# Patient Record
Sex: MALE | Race: WHITE | NOT HISPANIC OR LATINO | URBAN - METROPOLITAN AREA
[De-identification: names, ages, dates, MRNs, and addresses within clinical notes are randomized per-mention and may not be internally consistent; named-entity substitution may affect disease eponyms.]

---

## 2017-01-25 ENCOUNTER — OFFICE VISIT - RIVER FALLS (OUTPATIENT)
Dept: FAMILY MEDICINE | Facility: CLINIC | Age: 42
End: 2017-01-25

## 2017-01-25 ASSESSMENT — MIFFLIN-ST. JEOR: SCORE: 1756.93

## 2017-10-11 ENCOUNTER — OFFICE VISIT - RIVER FALLS (OUTPATIENT)
Dept: FAMILY MEDICINE | Facility: CLINIC | Age: 42
End: 2017-10-11

## 2017-10-11 ASSESSMENT — MIFFLIN-ST. JEOR: SCORE: 1750.46

## 2019-10-01 ENCOUNTER — OFFICE VISIT - RIVER FALLS (OUTPATIENT)
Dept: FAMILY MEDICINE | Facility: CLINIC | Age: 44
End: 2019-10-01

## 2019-10-01 LAB
ALBUMIN UR-MCNC: NEGATIVE G/DL
APPEARANCE UR: CLEAR
BILIRUB UR QL STRIP: NEGATIVE
COLOR UR AUTO: YELLOW
GLUCOSE UR STRIP-MCNC: NEGATIVE MG/DL
HGB UR QL STRIP: NEGATIVE
KETONES UR STRIP-MCNC: NEGATIVE MG/DL
LEUKOCYTE ESTERASE UR QL STRIP: NEGATIVE
NITRATE UR QL: NEGATIVE
PH UR STRIP: 6 [PH]
SP GR UR STRIP: 1.01
UROBILINOGEN UR STRIP-MCNC: NORMAL MG/DL

## 2019-10-01 ASSESSMENT — MIFFLIN-ST. JEOR: SCORE: 1761.24

## 2020-01-07 ENCOUNTER — OFFICE VISIT - RIVER FALLS (OUTPATIENT)
Dept: FAMILY MEDICINE | Facility: CLINIC | Age: 45
End: 2020-01-07

## 2020-01-07 ASSESSMENT — MIFFLIN-ST. JEOR: SCORE: 1749.45

## 2022-02-12 VITALS
HEIGHT: 71 IN | HEART RATE: 78 BPM | BODY MASS INDEX: 26.63 KG/M2 | SYSTOLIC BLOOD PRESSURE: 98 MMHG | WEIGHT: 190.2 LBS | OXYGEN SATURATION: 98 % | DIASTOLIC BLOOD PRESSURE: 66 MMHG | TEMPERATURE: 98.2 F

## 2022-02-12 VITALS
DIASTOLIC BLOOD PRESSURE: 60 MMHG | BODY MASS INDEX: 24.98 KG/M2 | HEIGHT: 72 IN | HEART RATE: 56 BPM | TEMPERATURE: 96.4 F | SYSTOLIC BLOOD PRESSURE: 96 MMHG | WEIGHT: 184.4 LBS

## 2022-02-12 VITALS
HEIGHT: 72 IN | TEMPERATURE: 97.5 F | SYSTOLIC BLOOD PRESSURE: 110 MMHG | BODY MASS INDEX: 24.92 KG/M2 | OXYGEN SATURATION: 97 % | DIASTOLIC BLOOD PRESSURE: 60 MMHG | WEIGHT: 184 LBS | HEART RATE: 67 BPM

## 2022-02-12 VITALS
HEART RATE: 49 BPM | HEIGHT: 72 IN | BODY MASS INDEX: 25.27 KG/M2 | OXYGEN SATURATION: 96 % | DIASTOLIC BLOOD PRESSURE: 60 MMHG | WEIGHT: 186.6 LBS | SYSTOLIC BLOOD PRESSURE: 102 MMHG

## 2022-02-15 NOTE — PROGRESS NOTES
Patient:   QUYNH LOZADA            MRN: [id number]            FIN: [id number]               Age:   44 years     Sex:  Male     :  1975   Associated Diagnoses:   's permit physical examination   Author:   Zachariah Segovia PA-C      Visit Information      Date of Service: 10/01/2019 10:00 am  Performing Location: [facility]  Encounter#: [id number]      Primary Care Provider (PCP):  Zachariah Segovia PA-C    NPI# [id number]      Referring Provider:  Zachariah Segovia PA-C    NPI# [id number]   Visit type:  DOT exam.    Accompanied by:  No one.    Source of history:  Self.    Referral source:  Self.       Chief Complaint   10/1/2019 10:04 AM CDT   DOT PX and UA     Here for DOT exam.      Well Adult History   Well Adult History   See scanned DOT form for history..     Remote amputation tip right thumb. No limitations      Review of Systems   Constitutional:  Negative.    Eye:  Negative.    Ear/Nose/Mouth/Throat:  Negative.    Respiratory:  Negative.    Cardiovascular:  Negative.    Gastrointestinal:  Negative.    Genitourinary:  Negative.    Hematology/Lymphatics:  Negative.    Endocrine:  Negative.    Immunologic:  Negative.    Musculoskeletal:  Negative.    Integumentary:  Negative.    Neurologic:  Negative.    Psychiatric:  Negative.    All other systems reviewed and negative      Health Status   Allergies:    Allergic Reactions (All)  No Known Medication Allergies   Medications:  (Selected)      Problem list:    No problem items selected or recorded.      Histories   Past Medical History:    No active or resolved past medical history items have been selected or recorded.   Family History:    No family history items have been selected or recorded.   Procedure history:    No active procedure history items have been selected or recorded.   Social History:             No active social history items have been recorded.      Physical Examination   Vital Signs   10/1/2019 10:04 AM CDT Peripheral Pulse  Rate 49 bpm  LOW    HR Method Electronic    Systolic Blood Pressure 102 mmHg    Diastolic Blood Pressure 60 mmHg    Mean Arterial Pressure 74 mmHg    BP Site Right arm    BP Method Manual    Oxygen Saturation 96 %      Measurements from flowsheet : Measurements   10/1/2019 10:04 AM CDT Height Measured - Standard 71.8 in    Weight Measured - Standard 186.6 lb    BSA 2.07 m2    Body Mass Index 25.45 kg/m2  HI      General:  Alert and oriented, No acute distress.    Eye:  Pupils are equal, round and reactive to light, Extraocular movements are intact, Normal conjunctiva, Vision unchanged.    HENT:  Normocephalic, Tympanic membranes are clear, Normal hearing, Oral mucosa is moist, No pharyngeal erythema.    Neck:  Supple, Non-tender, No lymphadenopathy.    Respiratory:  Lungs are clear to auscultation, Respirations are non-labored, Breath sounds are equal.    Cardiovascular:  Normal rate, Regular rhythm, No murmur.    Gastrointestinal:  Soft, Non-tender, Non-distended, Normal bowel sounds, No organomegaly.    Genitourinary:  No costovertebral angle tenderness.    Musculoskeletal:  Normal range of motion, Normal strength, No tenderness, Normal gait.    Integumentary:  Warm, Moist, No rash.    Neurologic:  Alert, Oriented, No focal deficits, Normal deep tendon reflexes.    Psychiatric:  Cooperative, Appropriate mood & affect.       Health Maintenance      Recommendations     Pending (in the next year)        OverDue           HIV Screen (if sexually active) (Male) due  10/03/12  and every 1  year(s)           Alcohol Misuse Screen (Male) due  01/25/18  and every 1  year(s)           Depression Screen (Male) due  01/25/18  and every 1  year(s)        Due            Influenza Vaccine due  09/01/19  and every 1  year(s)           Lipid Disorders Screen (Male) due  10/01/19  and every 1  year(s)           STD Counseling (if sexually active) (Male) due  10/01/19  and every 1  year(s)           Syphilis Screen (if sexually  active) (Male) due  10/01/19  and every 1  year(s)           Tetanus Vaccine due  10/01/19  and every 10  year(s)     Satisfied (in the past 1 year)        Satisfied            Body Mass Index Check (Male) on  10/01/19.           High Blood Pressure Screen (Male) on  10/01/19.           Tobacco Use Screen (Male) on  10/01/19.          Review / Management   Results review:  See UA report..       Impression and Plan   Diagnosis     's permit physical examination (BDI19-MR Z02.4).     Cleared for two years.

## 2022-02-15 NOTE — NURSING NOTE
Urine Dipstick POC Entered On:  10/1/2019 10:21 AM CDT    Performed On:  10/1/2019 10:20 AM CDT by Schoenike , Andrea               Urine Dipstick POC   Urine Color Urine Dipstick :   Yellow   Urine Appearance Urine Dipstick :   Clear   Glucose Urine Dipstick :   Negative   Bilirubin Urine Dipstick :   Negative   Ketones Urine Dipstick :   Negative   Specific Gravity Urine Dipstick :   1.015   Blood Urine Dipstick :   Negative   pH Urine Dipstick :   6   Protein Urine Dipstick :   Negative   Urobilinogen Urine Dipstick :   0.2 mg/dl   Nitrite Urine Dipstick :   Negative   Leukocytes Urine Dipstick :   Negative   Schoenike , Andrea - 10/1/2019 10:20 AM CDT   Details   Collection Date :   10/1/2019 10:20 AM CDT   Handling Specimen POC :   Midstream   POC Test Comments :   Lab Test Preformed by:   [facility] Office  [address] [place] [address]  Phone: [phone]  Fax: [phone]     Schoenike , Andrea - 10/1/2019 10:20 AM CDT

## 2022-02-15 NOTE — LETTER
(Inserted Image. Unable to display)   January 07, 2021      QUYNH LOZADA  [address]  [place], [place] [id number]        Dear QUYNH,      Thank you for selecting [facility] (previously [facility]) for your healthcare needs.     Our records indicate you are due for the following services:     Annual Physical    (FYI   Regarding office visits: In some instances, a video visit or telephone visit may be offered as an option.)      To schedule an appointment or if you have further questions, please contact your clinic at [phone].      Powered by [facility]    Sincerely,    Zachariah Segovia PA-C

## 2022-02-15 NOTE — NURSING NOTE
Comprehensive Intake Entered On:  1/7/2020 3:28 PM CST    Performed On:  1/7/2020 3:24 PM CST by Vika Hernandez CMA   Chief Complaint :   cough since Friday; more painful in chest; runny nose; sore throat due to coughing; chills slight fever   Menstrual Status :   N/A   Weight Measured :   184.0 lb(Converted to: 184 lb 0 oz, 83.46 kg)    Height Measured :   71.8 in(Converted to: 6 ft 0 in, 182.37 cm)    Body Mass Index :   25.09 kg/m2 (HI)    Body Surface Area :   2.05 m2   Systolic Blood Pressure :   110 mmHg   Diastolic Blood Pressure :   60 mmHg   Mean Arterial Pressure :   77 mmHg   Peripheral Pulse Rate :   67 bpm   BP Method :   Manual   HR Method :   Electronic   Temperature Tympanic :   97.5 DegF(Converted to: 36.4 DegC)  (LOW)    Oxygen Saturation :   97 %   Vika Hernandez CMA - 1/7/2020 3:24 PM CST   Health Status   Allergies Verified? :   Yes   Medication History Verified? :   Yes   Medical History Verified? :   Yes   Pre-Visit Planning Status :   Completed   Tobacco Use? :   Former smoker   Vika Hernandez CMA - 1/7/2020 3:24 PM CST   Consents   Consent for Immunization Exchange :   Consent Granted   Consent for Immunizations to Providers :   Consent Granted   Vika Hernandez CMA - 1/7/2020 3:24 PM CST   Meds / Allergies   (As Of: 1/7/2020 3:28:47 PM CST)   Allergies (Active)   No Known Medication Allergies  Estimated Onset Date:   Unspecified ; Created By:   Jillian Willard; Reaction Status:   Active ; Category:   Drug ; Substance:   No Known Medication Allergies ; Type:   Allergy ; Updated By:   Jillian Willard; Reviewed Date:   1/7/2020 3:27 PM CST        Medication List   (As Of: 1/7/2020 3:28:47 PM CST)   No Known Home Medications     Vika Hernandez CMA - 1/7/2020 3:27:05 PM

## 2022-02-15 NOTE — PROGRESS NOTES
Patient:   QUYNH LOZADA            MRN: [id number]            FIN: [id number]               Age:   44 years     Sex:  Male     :  1975   Associated Diagnoses:   Acute mucous pneumonia   Author:   Zachariah Segovia PA-C      Report Summary   Diagnosis  Acute mucous pneumonia (OMW18-RL J18.9).  Patient InstructionsOrders   Visit Information      Date of Service: 2020 03:00 pm  [facility]  Encounter[facility] [id number]      Primary Care Provider (PCP):  Zachariah Segovia PA-C    NPI# [id number]      Referring Provider:  Zachariah Segovia PA-C    NPI# [id number]   Visit type:  New symptom.    Source of history:  Self, Medical record.    History limitation:  None.       Chief Complaint   2020 3:24 PM CST     cough since Friday; more painful in chest; runny nose; sore throat due to coughing; chills slight fever        History of Present Illness             The patient presents with cough.  The cough is described as hacking.  The severity of the cough is moderate.  The cough is constant.  The cough has lasted for 5 day(s).  Associated symptoms consist of chills, fever, nasal congestion and rhinorrhea.  Feels tight in chest. Cough occasionally productive..  CC above noted and confirmed with the patient..        Review of Systems   Eye:  Negative.    Ear/Nose/Mouth/Throat:  Negative except as documented in history of present illness.    Respiratory:  Negative except as documented in history of present illness.       Health Status   Allergies:    Allergic Reactions (All)  No Known Medication Allergies   Medications:  (Selected)   Prescriptions  Prescribed  doxycycline hyclate 100 mg oral tablet: = 1 tab(s) ( 100 mg ), PO, bid, x 10 day(s), Instructions: May fill with cheapest Doxy, # 20 tab(s), 0 Refill(s), Type: Acute, Pharmacy: [facility] #[id number], 1 tab(s) Oral bid,x10 day(s),Instr:May fill with cheapest Doxy  Documented Medications  Documented  acyclovir 800 mg oral tablet: 0  Refill(s), Type: Soft Stop,    Medications          *denotes recorded medication          *acyclovir 800 mg oral tablet: 0 Refill(s).          doxycycline hyclate 100 mg oral tablet: 100 mg, 1 tab(s), PO, bid, for 10 day(s), May fill with cheapest Doxy, 20 tab(s), 0 Refill(s).       Problem list:    No problem items selected or recorded.      Histories   Past Medical History:    No active or resolved past medical history items have been selected or recorded.   Family History:    No family history items have been selected or recorded.   Procedure history:    No active procedure history items have been selected or recorded.   Social History:             No active social history items have been recorded.      Physical Examination   Vital Signs   1/7/2020 3:24 PM CST Temperature Tympanic 97.5 DegF  LOW    Peripheral Pulse Rate 67 bpm    HR Method Electronic    Systolic Blood Pressure 110 mmHg    Diastolic Blood Pressure 60 mmHg    Mean Arterial Pressure 77 mmHg    BP Method Manual    Oxygen Saturation 97 %      Measurements from flowsheet : Measurements   1/7/2020 3:24 PM CST Height Measured - Standard 71.8 in    Weight Measured - Standard 184.0 lb    BSA 2.05 m2    Body Mass Index 25.09 kg/m2  HI      General:  Alert and oriented, No acute distress.    Eye:  Pupils are equal, round and reactive to light, Extraocular movements are intact, Normal conjunctiva.    HENT:  Normocephalic, Oral mucosa is moist, No pharyngeal erythema.         Nose: Both nostrils, Discharge ( Small amount, Green ).    Neck:  Supple, Non-tender, No lymphadenopathy.    Respiratory:  Respirations are non-labored, Breath sounds are equal.         Breath sounds: Rhonchi present.    Cardiovascular:  Normal rate, Regular rhythm, No murmur.    Psychiatric:  Cooperative, Appropriate mood & affect.       Impression and Plan   Diagnosis     Acute mucous pneumonia (XRD85-EP J18.9).     Patient Instructions:       Counseled: Patient, Regarding diagnosis,  Regarding treatment, Regarding medications, Regarding activity, Verbalized understanding.    Orders     Orders (Selected)   Prescriptions  Prescribed  doxycycline hyclate 100 mg oral tablet: = 1 tab(s) ( 100 mg ), PO, bid, x 10 day(s), Instructions: May fill with cheapest Doxy, # 20 tab(s), 0 Refill(s), Type: Acute, Pharmacy: [facility] #[id number], 1 tab(s) Oral bid,x10 day(s),Instr:May fill with cheapest Doxy.     Take medicine as prescribed, side effects discussed.  Tylenol/ibuprofen for fever and discomfort.  Push fluids.  RTC if not improving in 36-48 hours, prior if concerns as we have discussed.

## 2022-02-15 NOTE — PROGRESS NOTES
Patient:   QUYNH LOZADA            MRN: [id number]            FIN: [id number]               Age:   41 years     Sex:  Male     :  1975   Associated Diagnoses:   Pneumonia   Author:   Zachariah Segovia PA-C      Visit Information      Date of Service: 2017 03:50 pm  Performing Location: [facility]  Encounter#: [id number]      Primary Care Provider (PCP):  Zachariah Segovia PA-C    NPI# [id number]   Visit type:  New symptom.    Source of history:  Self, Medical record.    History limitation:  None.       Chief Complaint   2017 3:53 PM CST    c/o cough, sinus congestion x 3 days      History of Present Illness             The patient presents with cough.  The cough is described as hacking.  The severity of the cough is moderate.  The cough is episodic, fluctuates in intensity and is worsening.  The cough has lasted for 5 day(s).  The context of the cough: occurred in association with illness.  Associated symptoms consist of nasal congestion, rhinorrhea and denies shortness of breath.        Review of Systems   Eye:  Negative.    Ear/Nose/Mouth/Throat:  Negative except as documented in history of present illness.    Respiratory:  Negative except as documented in history of present illness.    Cardiovascular:  Negative.    Gastrointestinal:  Negative.    Genitourinary:  Negative.    Hematology/Lymphatics:  Negative.    Endocrine:  Negative.    Immunologic:  Negative.    Musculoskeletal:  Negative.    Integumentary:  Negative.    Psychiatric:  Negative.       Health Status   Allergies:    Allergic Reactions (All)  No Known Medication Allergies   Medications:  (Selected)   Prescriptions  Prescribed  Zithromax 250 mg oral tablet: 1 packet(s), PO, Once, Instructions: as directed on package labeling. Two tablets po on day one, then one tablet daily x four days, # 6 tab(s), 0 Refill(s), Type: Soft Stop, Pharmacy: [facility] #[id number], 1 packet(s) po once,Instr:as directed on pac...    Problem list:    No problem items selected or recorded.      Histories   Past Medical History:    No active or resolved past medical history items have been selected or recorded.   Family History:    No family history items have been selected or recorded.   Procedure history:    No active procedure history items have been selected or recorded.   Social History:             No active social history items have been recorded.      Physical Examination   Vital Signs   1/25/2017 3:53 PM CST Temperature Temporal 98.2 DegF    Peripheral Pulse Rate 78 bpm    Systolic Blood Pressure 98 mmHg    Diastolic Blood Pressure 66 mmHg    Mean Arterial Pressure 77 mmHg    BP Site Left arm    BP Method Manual    Oxygen Saturation 98 %      Measurements from flowsheet : Measurements   1/25/2017 3:53 PM CST Height Measured - Standard 70.50 in    Weight Measured - Standard 190.2 lb    BSA 2.07 m2    Body Mass Index 26.9 kg/m2      General:  Alert and oriented, No acute distress.    Eye:  Pupils are equal, round and reactive to light, Extraocular movements are intact, Normal conjunctiva.    HENT:  Normocephalic, Oral mucosa is moist, No pharyngeal erythema.    Neck:  Supple, Non-tender, No lymphadenopathy.    Respiratory:  Lungs are clear to auscultation, Respirations are non-labored, Breath sounds are equal.    Cardiovascular:  Normal rate, Regular rhythm, No murmur.    Psychiatric:  Cooperative, Appropriate mood & affect.       Impression and Plan   Diagnosis     Pneumonia (MTU51-ML J18.9).     Patient Instructions:       Counseled: Patient, Regarding diagnosis, Regarding treatment, Regarding medications, Regarding activity, Verbalized understanding.    Take medicine as prescribed, side effects discussed.  Tylenol/ibuprofen for fever and discomfort.  Push fluids.  RTC if not improving in 36-48 hours, prior if concerns as we have discussed.

## 2022-02-15 NOTE — NURSING NOTE
Comprehensive Intake Entered On:  10/1/2019 10:11 AM CDT    Performed On:  10/1/2019 10:04 AM CDT by Usha Chery CMA               Summary   Chief Complaint :   DOT PX and UA   Weight Measured :   186.6 lb(Converted to: 186 lb 10 oz, 84.64 kg)    Height Measured :   71.8 in(Converted to: 6 ft 0 in, 182.37 cm)    Body Mass Index :   25.45 kg/m2 (HI)    Body Surface Area :   2.07 m2   Systolic Blood Pressure :   102 mmHg   Diastolic Blood Pressure :   60 mmHg   Mean Arterial Pressure :   74 mmHg   Peripheral Pulse Rate :   49 bpm (LOW)    BP Site :   Right arm   BP Method :   Manual   HR Method :   Electronic   Oxygen Saturation :   96 %   Usha Chery CMA - 10/1/2019 10:04 AM CDT   Health Status   Allergies Verified? :   Yes   Medication History Verified? :   Yes   Medical History Verified? :   Yes   Tobacco Use? :   Former smoker   Usha Chery CMA - 10/1/2019 10:04 AM CDT   Consents   Consent for Immunization Exchange :   Consent Granted   Consent for Immunizations to Providers :   Consent Granted   Usha Chery CMA - 10/1/2019 10:04 AM CDT   Meds / Allergies   (As Of: 10/1/2019 10:11:01 AM CDT)   Allergies (Active)   No Known Medication Allergies  Estimated Onset Date:   Unspecified ; Created By:   Jillian Willard; Reaction Status:   Active ; Category:   Drug ; Substance:   No Known Medication Allergies ; Type:   Allergy ; Updated By:   Jillian Willard; Reviewed Date:   10/1/2019 10:08 AM CDT        Medication List   (As Of: 10/1/2019 10:11:01 AM CDT)   No Known Home Medications     Usha Chery CMA - 10/1/2019 10:05:48 AM           Vision Testing POC   Corrective Lenses :   Glasses, Contact lenses   Color Blind Correct Plates :   normal   Eye, Left with Correction Visual Acuity :   20/13   Eye, Right with Correction Visual Acuity :   20/13   Eye, Bilat w/Correction Visual Acuity :   20/13   Usha Chery CMA - 10/1/2019 10:04 AM CDT

## 2022-02-15 NOTE — PROGRESS NOTES
Patient:   QUYNH LOZADA            MRN: [id number]            FIN: [id number]               Age:   42 years     Sex:  Male     :  1975   Associated Diagnoses:   's permit physical examination   Author:   Zachariah Segovia PA-C      Visit Information      Date of Service: 10/11/2017 08:00 am  Performing Location: [facility]  Encounter#: [id number]      Primary Care Provider (PCP):  Zachariah Segovia PA-C    NPI# [id number]      Referring Provider:  Zachariah Segovia PA-C, NPI# [id number]   Visit type:  DOT exam.    Accompanied by:  No one.    Source of history:  Self.    Referral source:  Self.       Chief Complaint   10/11/2017 8:14 AM CDT   DOT and UA     Here for DOT exam.      Well Adult History   Well Adult History   See scanned DOT form for history..        Review of Systems   Constitutional:  Negative.    Eye:  Negative.    Ear/Nose/Mouth/Throat:  Negative.    Respiratory:  Negative.    Cardiovascular:  Negative.    Gastrointestinal:  Negative.    Genitourinary:  Negative.    Hematology/Lymphatics:  Negative.    Endocrine:  Negative.    Immunologic:  Negative.    Musculoskeletal:  Negative.    Integumentary:  Negative.    Neurologic:  Negative.    Psychiatric:  Negative.    All other systems reviewed and negative      Health Status   Allergies:    Allergic Reactions (All)  No Known Medication Allergies   Medications:  (Selected)      Problem list:    No problem items selected or recorded.      Histories   Past Medical History:    No active or resolved past medical history items have been selected or recorded.   Family History:    No family history items have been selected or recorded.   Procedure history:    No active procedure history items have been selected or recorded.   Social History:             No active social history items have been recorded.      Physical Examination   Vital Signs   10/11/2017 8:14 AM CDT Temperature Tympanic 96.4 DegF  LOW    Peripheral Pulse Rate 56 bpm  LOW     Pulse Site Radial artery    HR Method Manual    Systolic Blood Pressure 96 mmHg    Diastolic Blood Pressure 60 mmHg    Mean Arterial Pressure 72 mmHg    BP Site Left arm    BP Method Manual      Measurements from flowsheet : Measurements   10/11/2017 8:14 AM CDT Height Measured - Standard 71.75 in    Weight Measured - Standard 184.4 lb    BSA 2.06 m2    Body Mass Index 25.18 kg/m2      General:  Alert and oriented, No acute distress.    Eye:  Pupils are equal, round and reactive to light, Extraocular movements are intact, Normal conjunctiva, Vision unchanged.    HENT:  Normocephalic, Tympanic membranes are clear, Normal hearing, Oral mucosa is moist, No pharyngeal erythema.    Neck:  Supple, Non-tender, No lymphadenopathy.    Respiratory:  Lungs are clear to auscultation, Respirations are non-labored, Breath sounds are equal.    Cardiovascular:  Normal rate, Regular rhythm, No murmur.    Gastrointestinal:  Soft, Non-tender, Non-distended, Normal bowel sounds, No organomegaly.    Genitourinary:  No costovertebral angle tenderness.    Musculoskeletal:  Normal range of motion, Normal strength, No tenderness, Normal gait.    Integumentary:  Warm, Moist, No rash.    Neurologic:  Alert, Oriented, No focal deficits, Normal deep tendon reflexes.    Psychiatric:  Cooperative, Appropriate mood & affect.       Health Maintenance      Recommendations     Pending (in the next year)        OverDue           HIV Screen (if sexually active) (Male) due  10/03/12  and every 1  year(s)        Due            Lipid Disorders Screen (Male) due  10/11/17  and every 1  year(s)           STD Counseling (if sexually active) (Male) due  10/11/17  and every 1  year(s)           Syphilis Screen (if sexually active) (Male) due  10/11/17  and every 1  year(s)           Tetanus Vaccine due  10/11/17  and every 10  year(s)        Due In Future            Alcohol Misuse Screen (Male) not due until  01/25/18  and every 1  year(s)           Depression  Screen (Male) not due until  01/25/18  and every 1  year(s)     Satisfied (in the past 1 year)        Satisfied            Alcohol Misuse Screen (Male) on  01/25/17.           Body Mass Index Check (Male) on  10/11/17.           Body Mass Index Check (Male) on  01/25/17.           Depression Screen (Male) on  01/25/17.           Depression Screen (Male) on  01/25/17.           Depression Screen (Male) on  01/25/17.           High Blood Pressure Screen (Male) on  10/11/17.           High Blood Pressure Screen (Male) on  01/25/17.           Tobacco Use Screen (Male) on  10/11/17.           Tobacco Use Screen (Male) on  01/25/17.          Review / Management   Results review:  See UA report..       Impression and Plan   Diagnosis     's permit physical examination (IMO92-QX Z02.4).     Cleared for two years.